# Patient Record
Sex: FEMALE | Race: NATIVE HAWAIIAN OR OTHER PACIFIC ISLANDER | URBAN - METROPOLITAN AREA
[De-identification: names, ages, dates, MRNs, and addresses within clinical notes are randomized per-mention and may not be internally consistent; named-entity substitution may affect disease eponyms.]

---

## 2017-05-08 ENCOUNTER — IMPORTED ENCOUNTER (OUTPATIENT)
Dept: URBAN - METROPOLITAN AREA CLINIC 31 | Facility: CLINIC | Age: 37
End: 2017-05-08

## 2017-05-08 PROCEDURE — 92015 DETERMINE REFRACTIVE STATE: CPT

## 2017-05-08 PROCEDURE — 92014 COMPRE OPH EXAM EST PT 1/>: CPT

## 2017-05-11 ENCOUNTER — IMPORTED ENCOUNTER (OUTPATIENT)
Dept: URBAN - METROPOLITAN AREA CLINIC 31 | Facility: CLINIC | Age: 37
End: 2017-05-11

## 2017-05-11 PROBLEM — H10.403: Noted: 2017-05-11

## 2017-05-11 PROCEDURE — 99213 OFFICE O/P EST LOW 20 MIN: CPT

## 2017-05-11 NOTE — PATIENT DISCUSSION
Allergic Conjunctivitis OU -- The condition was  discussed with the patient. Avoidance of allergens and cool compresses were recommended. Sample of Alrex given.

## 2020-06-11 ENCOUNTER — OFFICE VISIT (OUTPATIENT)
Dept: URBAN - METROPOLITAN AREA CLINIC 7 | Facility: CLINIC | Age: 40
End: 2020-06-11

## 2020-06-23 ENCOUNTER — TELEPHONE ENCOUNTER (OUTPATIENT)
Dept: URBAN - METROPOLITAN AREA CLINIC 9 | Facility: CLINIC | Age: 40
End: 2020-06-23

## 2020-06-23 ENCOUNTER — OFFICE VISIT (OUTPATIENT)
Dept: URBAN - METROPOLITAN AREA CLINIC 7 | Facility: CLINIC | Age: 40
End: 2020-06-23

## 2020-06-25 ENCOUNTER — TELEPHONE ENCOUNTER (OUTPATIENT)
Dept: URBAN - METROPOLITAN AREA CLINIC 9 | Facility: CLINIC | Age: 40
End: 2020-06-25

## 2020-06-27 LAB
MITOGEN-NIL: (no result)
NIL: (no result)
QUANTIFERON(R)-TB GOLD PLUS, 1 TUBE: POSITIVE
TB1-NIL: (no result)
TB2-NIL: (no result)

## 2020-06-29 ENCOUNTER — TELEPHONE ENCOUNTER (OUTPATIENT)
Dept: URBAN - METROPOLITAN AREA CLINIC 9 | Facility: CLINIC | Age: 40
End: 2020-06-29

## 2020-06-29 ENCOUNTER — OFFICE VISIT (OUTPATIENT)
Dept: URBAN - METROPOLITAN AREA CLINIC 7 | Facility: CLINIC | Age: 40
End: 2020-06-29

## 2020-06-30 ENCOUNTER — TELEPHONE ENCOUNTER (OUTPATIENT)
Dept: URBAN - METROPOLITAN AREA CLINIC 9 | Facility: CLINIC | Age: 40
End: 2020-06-30

## 2020-07-01 ENCOUNTER — TELEPHONE ENCOUNTER (OUTPATIENT)
Dept: URBAN - METROPOLITAN AREA CLINIC 9 | Facility: CLINIC | Age: 40
End: 2020-07-01

## 2020-07-08 ENCOUNTER — TELEPHONE ENCOUNTER (OUTPATIENT)
Dept: URBAN - METROPOLITAN AREA CLINIC 9 | Facility: CLINIC | Age: 40
End: 2020-07-08

## 2020-07-15 ENCOUNTER — TELEPHONE ENCOUNTER (OUTPATIENT)
Dept: URBAN - METROPOLITAN AREA CLINIC 9 | Facility: CLINIC | Age: 40
End: 2020-07-15

## 2020-07-16 ENCOUNTER — OFFICE VISIT (OUTPATIENT)
Dept: URBAN - METROPOLITAN AREA CLINIC 7 | Facility: CLINIC | Age: 40
End: 2020-07-16

## 2020-07-20 ENCOUNTER — TELEPHONE ENCOUNTER (OUTPATIENT)
Dept: URBAN - METROPOLITAN AREA CLINIC 9 | Facility: CLINIC | Age: 40
End: 2020-07-20

## 2020-07-23 ENCOUNTER — TELEPHONE ENCOUNTER (OUTPATIENT)
Dept: URBAN - METROPOLITAN AREA CLINIC 9 | Facility: CLINIC | Age: 40
End: 2020-07-23

## 2020-07-23 ENCOUNTER — OFFICE VISIT (OUTPATIENT)
Dept: URBAN - METROPOLITAN AREA CLINIC 7 | Facility: CLINIC | Age: 40
End: 2020-07-23

## 2020-07-24 ENCOUNTER — TELEPHONE ENCOUNTER (OUTPATIENT)
Dept: URBAN - METROPOLITAN AREA CLINIC 9 | Facility: CLINIC | Age: 40
End: 2020-07-24

## 2020-07-27 ENCOUNTER — OFFICE VISIT (OUTPATIENT)
Dept: URBAN - METROPOLITAN AREA CLINIC 7 | Facility: CLINIC | Age: 40
End: 2020-07-27

## 2020-07-28 ENCOUNTER — TELEPHONE ENCOUNTER (OUTPATIENT)
Dept: URBAN - METROPOLITAN AREA CLINIC 9 | Facility: CLINIC | Age: 40
End: 2020-07-28

## 2020-07-30 ENCOUNTER — OFFICE VISIT (OUTPATIENT)
Dept: URBAN - METROPOLITAN AREA CLINIC 7 | Facility: CLINIC | Age: 40
End: 2020-07-30

## 2020-08-06 ENCOUNTER — OFFICE VISIT (OUTPATIENT)
Dept: URBAN - METROPOLITAN AREA CLINIC 7 | Facility: CLINIC | Age: 40
End: 2020-08-06

## 2020-08-18 ENCOUNTER — OFFICE VISIT (OUTPATIENT)
Dept: URBAN - METROPOLITAN AREA SURGERY CENTER 5 | Facility: SURGERY CENTER | Age: 40
End: 2020-08-18

## 2020-08-25 ENCOUNTER — LAB OUTSIDE AN ENCOUNTER (OUTPATIENT)
Age: 40
End: 2020-08-25

## 2020-08-28 LAB
BUN/CREATININE RATIO: (no result)
CALCIUM: (no result)
CARBON DIOXIDE: (no result)
CHLORIDE: (no result)
CREATININE: (no result)
EGFR AFRICAN AMERICAN: 128
EGFR NON-AFR. AMERICAN: 110
GLUCOSE: (no result)
POTASSIUM: (no result)
SODIUM: (no result)
UREA NITROGEN (BUN): (no result)

## 2020-08-31 ENCOUNTER — TELEPHONE ENCOUNTER (OUTPATIENT)
Dept: URBAN - METROPOLITAN AREA CLINIC 9 | Facility: CLINIC | Age: 40
End: 2020-08-31

## 2020-09-22 ENCOUNTER — TELEPHONE ENCOUNTER (OUTPATIENT)
Dept: URBAN - METROPOLITAN AREA CLINIC 9 | Facility: CLINIC | Age: 40
End: 2020-09-22

## 2020-09-23 ENCOUNTER — TELEPHONE ENCOUNTER (OUTPATIENT)
Dept: URBAN - METROPOLITAN AREA CLINIC 9 | Facility: CLINIC | Age: 40
End: 2020-09-23

## 2020-10-01 ENCOUNTER — OFFICE VISIT (OUTPATIENT)
Age: 40
End: 2020-10-01

## 2020-10-02 ENCOUNTER — TELEPHONE ENCOUNTER (OUTPATIENT)
Dept: URBAN - METROPOLITAN AREA CLINIC 9 | Facility: CLINIC | Age: 40
End: 2020-10-02

## 2020-10-07 ENCOUNTER — OFFICE VISIT (OUTPATIENT)
Dept: URBAN - METROPOLITAN AREA CLINIC 7 | Facility: CLINIC | Age: 40
End: 2020-10-07

## 2020-11-02 ENCOUNTER — TELEPHONE ENCOUNTER (OUTPATIENT)
Dept: URBAN - METROPOLITAN AREA CLINIC 9 | Facility: CLINIC | Age: 40
End: 2020-11-02

## 2020-12-30 LAB — HELICOBACTER PYLORI AG, EIA, STOOL: (no result)

## 2021-01-06 ENCOUNTER — LAB OUTSIDE AN ENCOUNTER (OUTPATIENT)
Age: 41
End: 2021-01-06

## 2021-01-06 ENCOUNTER — TELEPHONE ENCOUNTER (OUTPATIENT)
Dept: URBAN - METROPOLITAN AREA CLINIC 9 | Facility: CLINIC | Age: 41
End: 2021-01-06

## 2021-01-07 ENCOUNTER — TELEPHONE ENCOUNTER (OUTPATIENT)
Dept: URBAN - METROPOLITAN AREA CLINIC 9 | Facility: CLINIC | Age: 41
End: 2021-01-07

## 2021-01-08 ENCOUNTER — TELEPHONE ENCOUNTER (OUTPATIENT)
Dept: URBAN - METROPOLITAN AREA CLINIC 9 | Facility: CLINIC | Age: 41
End: 2021-01-08

## 2021-05-21 ENCOUNTER — OFFICE VISIT (OUTPATIENT)
Dept: URBAN - METROPOLITAN AREA CLINIC 7 | Facility: CLINIC | Age: 41
End: 2021-05-21

## 2021-06-18 ENCOUNTER — OFFICE VISIT (OUTPATIENT)
Dept: URBAN - METROPOLITAN AREA SURGERY CENTER 5 | Facility: SURGERY CENTER | Age: 41
End: 2021-06-18

## 2021-06-30 ENCOUNTER — TELEPHONE ENCOUNTER (OUTPATIENT)
Dept: URBAN - METROPOLITAN AREA CLINIC 9 | Facility: CLINIC | Age: 41
End: 2021-06-30

## 2021-07-01 ENCOUNTER — OFFICE VISIT (OUTPATIENT)
Age: 41
End: 2021-07-01

## 2021-07-16 ENCOUNTER — TELEPHONE ENCOUNTER (OUTPATIENT)
Dept: URBAN - METROPOLITAN AREA CLINIC 9 | Facility: CLINIC | Age: 41
End: 2021-07-16

## 2021-07-16 ENCOUNTER — LAB OUTSIDE AN ENCOUNTER (OUTPATIENT)
Age: 41
End: 2021-07-16

## 2021-07-21 ENCOUNTER — TELEPHONE ENCOUNTER (OUTPATIENT)
Dept: URBAN - METROPOLITAN AREA CLINIC 9 | Facility: CLINIC | Age: 41
End: 2021-07-21

## 2021-07-26 ENCOUNTER — TELEPHONE ENCOUNTER (OUTPATIENT)
Dept: URBAN - METROPOLITAN AREA CLINIC 9 | Facility: CLINIC | Age: 41
End: 2021-07-26

## 2021-07-26 ENCOUNTER — OFFICE VISIT (OUTPATIENT)
Dept: URBAN - METROPOLITAN AREA CLINIC 7 | Facility: CLINIC | Age: 41
End: 2021-07-26

## 2021-08-06 ENCOUNTER — TELEPHONE ENCOUNTER (OUTPATIENT)
Dept: URBAN - METROPOLITAN AREA CLINIC 9 | Facility: CLINIC | Age: 41
End: 2021-08-06

## 2021-08-06 LAB — HELICOBACTER PYLORI AG, EIA, STOOL: (no result)

## 2021-08-09 ENCOUNTER — OFFICE VISIT (OUTPATIENT)
Dept: URBAN - METROPOLITAN AREA CLINIC 7 | Facility: CLINIC | Age: 41
End: 2021-08-09

## 2021-08-10 ENCOUNTER — TELEPHONE ENCOUNTER (OUTPATIENT)
Dept: URBAN - METROPOLITAN AREA CLINIC 9 | Facility: CLINIC | Age: 41
End: 2021-08-10

## 2021-09-03 ENCOUNTER — OFFICE VISIT (OUTPATIENT)
Dept: URBAN - METROPOLITAN AREA CLINIC 7 | Facility: CLINIC | Age: 41
End: 2021-09-03

## 2021-09-03 ENCOUNTER — TELEPHONE ENCOUNTER (OUTPATIENT)
Dept: URBAN - METROPOLITAN AREA CLINIC 9 | Facility: CLINIC | Age: 41
End: 2021-09-03

## 2021-09-08 ENCOUNTER — TELEPHONE ENCOUNTER (OUTPATIENT)
Dept: URBAN - METROPOLITAN AREA CLINIC 9 | Facility: CLINIC | Age: 41
End: 2021-09-08

## 2021-09-13 ENCOUNTER — TELEPHONE ENCOUNTER (OUTPATIENT)
Dept: URBAN - METROPOLITAN AREA CLINIC 9 | Facility: CLINIC | Age: 41
End: 2021-09-13

## 2021-09-13 LAB
ALBUMIN/GLOBULIN RATIO: (no result)
ALBUMIN: (no result)
ALKALINE PHOSPHATASE: (no result)
ALT: (no result)
AST: (no result)
BILIRUBIN, TOTAL: (no result)
BUN/CREATININE RATIO: (no result)
C-REACTIVE PROTEIN: (no result)
CALCIUM: (no result)
CARBON DIOXIDE: (no result)
CHLORIDE: (no result)
CREATININE: (no result)
EGFR AFRICAN AMERICAN: 128
EGFR NON-AFR. AMERICAN: 111
GLOBULIN: 3.2
GLUCOSE: (no result)
POTASSIUM: (no result)
PROTEIN, TOTAL: (no result)
SODIUM: (no result)
UREA NITROGEN (BUN): (no result)

## 2021-09-14 ENCOUNTER — OFFICE VISIT (OUTPATIENT)
Dept: URBAN - METROPOLITAN AREA SURGERY CENTER 5 | Facility: SURGERY CENTER | Age: 41
End: 2021-09-14

## 2021-09-14 ENCOUNTER — TELEPHONE ENCOUNTER (OUTPATIENT)
Dept: URBAN - METROPOLITAN AREA CLINIC 9 | Facility: CLINIC | Age: 41
End: 2021-09-14

## 2021-09-20 ENCOUNTER — TELEPHONE ENCOUNTER (OUTPATIENT)
Dept: URBAN - METROPOLITAN AREA CLINIC 9 | Facility: CLINIC | Age: 41
End: 2021-09-20

## 2021-09-22 ENCOUNTER — OFFICE VISIT (OUTPATIENT)
Dept: URBAN - METROPOLITAN AREA SURGERY CENTER 5 | Facility: SURGERY CENTER | Age: 41
End: 2021-09-22

## 2021-09-24 ENCOUNTER — TELEPHONE ENCOUNTER (OUTPATIENT)
Dept: URBAN - METROPOLITAN AREA CLINIC 9 | Facility: CLINIC | Age: 41
End: 2021-09-24

## 2021-09-27 ENCOUNTER — LAB OUTSIDE AN ENCOUNTER (OUTPATIENT)
Age: 41
End: 2021-09-27

## 2021-09-29 ENCOUNTER — TELEPHONE ENCOUNTER (OUTPATIENT)
Dept: URBAN - METROPOLITAN AREA CLINIC 9 | Facility: CLINIC | Age: 41
End: 2021-09-29

## 2021-10-01 ENCOUNTER — TELEPHONE ENCOUNTER (OUTPATIENT)
Dept: URBAN - METROPOLITAN AREA CLINIC 9 | Facility: CLINIC | Age: 41
End: 2021-10-01

## 2021-10-02 LAB
CALPROTECTIN, STOOL: (no result)
CLOSTRIDIUM DIFFICILE TOXIN/GDH W/REFL TO PCR: (no result)
HELICOBACTER PYLORI AG, EIA, STOOL: (no result)

## 2021-10-04 ENCOUNTER — OFFICE VISIT (OUTPATIENT)
Dept: URBAN - METROPOLITAN AREA CLINIC 7 | Facility: CLINIC | Age: 41
End: 2021-10-04

## 2021-10-04 ENCOUNTER — TELEPHONE ENCOUNTER (OUTPATIENT)
Dept: URBAN - METROPOLITAN AREA CLINIC 9 | Facility: CLINIC | Age: 41
End: 2021-10-04

## 2021-10-08 ENCOUNTER — TELEPHONE ENCOUNTER (OUTPATIENT)
Dept: URBAN - METROPOLITAN AREA CLINIC 9 | Facility: CLINIC | Age: 41
End: 2021-10-08

## 2021-11-03 LAB — Lab: (no result)

## 2021-11-04 LAB
C-REACTIVE PROTEIN: (no result)
HEPATITIS B SURFACE ANTIBODY QL: (no result)
HEPATITIS B SURFACE ANTIGEN: (no result)
HEPATITIS C ANTIBODY: (no result)
INDEX: 0.01
MITOGEN-NIL: (no result)
NIL: (no result)
QUANTIFERON(R)-TB GOLD PLUS, 1 TUBE: POSITIVE
TB1-NIL: (no result)
TB2-NIL: (no result)

## 2021-11-12 ENCOUNTER — TELEPHONE ENCOUNTER (OUTPATIENT)
Dept: URBAN - METROPOLITAN AREA CLINIC 9 | Facility: CLINIC | Age: 41
End: 2021-11-12

## 2022-01-18 ENCOUNTER — TELEPHONE ENCOUNTER (OUTPATIENT)
Dept: URBAN - METROPOLITAN AREA CLINIC 9 | Facility: CLINIC | Age: 42
End: 2022-01-18

## 2022-01-21 ENCOUNTER — TELEPHONE ENCOUNTER (OUTPATIENT)
Dept: URBAN - METROPOLITAN AREA CLINIC 9 | Facility: CLINIC | Age: 42
End: 2022-01-21

## 2022-01-27 ENCOUNTER — OFFICE VISIT (OUTPATIENT)
Age: 42
End: 2022-01-27

## 2022-03-08 ENCOUNTER — OFFICE VISIT (OUTPATIENT)
Age: 42
End: 2022-03-08

## 2022-03-09 ENCOUNTER — TELEPHONE ENCOUNTER (OUTPATIENT)
Dept: URBAN - METROPOLITAN AREA CLINIC 9 | Facility: CLINIC | Age: 42
End: 2022-03-09

## 2022-03-22 ENCOUNTER — TELEPHONE ENCOUNTER (OUTPATIENT)
Dept: URBAN - METROPOLITAN AREA CLINIC 9 | Facility: CLINIC | Age: 42
End: 2022-03-22

## 2022-03-28 ENCOUNTER — TELEPHONE ENCOUNTER (OUTPATIENT)
Dept: URBAN - METROPOLITAN AREA CLINIC 9 | Facility: CLINIC | Age: 42
End: 2022-03-28

## 2022-04-02 ASSESSMENT — VISUAL ACUITY
OD_CC: 20/40
OS_CC: 20/20
OS_CC: 20/25
OD_CC: 20/25
OD_PH: SC 20/25

## 2022-04-02 ASSESSMENT — TONOMETRY
OD_IOP_MMHG: 14
OS_IOP_MMHG: 14

## 2022-05-19 ENCOUNTER — TELEPHONE ENCOUNTER (OUTPATIENT)
Dept: URBAN - METROPOLITAN AREA CLINIC 9 | Facility: CLINIC | Age: 42
End: 2022-05-19

## 2022-05-20 ENCOUNTER — TELEPHONE ENCOUNTER (OUTPATIENT)
Dept: URBAN - METROPOLITAN AREA CLINIC 9 | Facility: CLINIC | Age: 42
End: 2022-05-20

## 2022-06-07 ENCOUNTER — TELEPHONE ENCOUNTER (OUTPATIENT)
Dept: URBAN - METROPOLITAN AREA CLINIC 9 | Facility: CLINIC | Age: 42
End: 2022-06-07

## 2022-06-13 ENCOUNTER — OFFICE VISIT (OUTPATIENT)
Dept: URBAN - METROPOLITAN AREA SURGERY CENTER 5 | Facility: SURGERY CENTER | Age: 42
End: 2022-06-13

## 2022-07-09 ENCOUNTER — TELEPHONE ENCOUNTER (OUTPATIENT)
Dept: URBAN - METROPOLITAN AREA CLINIC 121 | Facility: CLINIC | Age: 42
End: 2022-07-09

## 2022-07-09 RX ORDER — PREDNISONE 1 MG/1
TABLET ORAL
Refills: 0 | OUTPATIENT
Start: 2011-10-24 | End: 2013-02-21

## 2022-07-09 RX ORDER — MESALAMINE 4 G/60ML
ENEMA RECTAL
Refills: 0 | OUTPATIENT
Start: 2012-12-31 | End: 2013-01-15

## 2022-07-09 RX ORDER — MESALAMINE 4 G/60ML
ENEMA RECTAL ONCE A DAY
Refills: 0 | OUTPATIENT
Start: 2013-02-21 | End: 2013-02-21

## 2022-07-09 RX ORDER — MESALAMINE 1.2 G/1
TABLET, DELAYED RELEASE ORAL
Refills: 0 | OUTPATIENT
Start: 2011-10-24 | End: 2013-02-21

## 2022-07-10 ENCOUNTER — TELEPHONE ENCOUNTER (OUTPATIENT)
Dept: URBAN - METROPOLITAN AREA CLINIC 121 | Facility: CLINIC | Age: 42
End: 2022-07-10

## 2022-07-10 RX ORDER — MESALAMINE 800 MG/1
TABLET, DELAYED RELEASE ORAL
Refills: 1 | Status: ACTIVE | COMMUNITY
Start: 2013-05-14

## 2022-07-10 RX ORDER — SODIUM SULFATE, POTASSIUM SULFATE, MAGNESIUM SULFATE 17.5; 3.13; 1.6 G/ML; G/ML; G/ML
SOLUTION, CONCENTRATE ORAL TAKE AS DIRECTED
Refills: 0 | Status: ACTIVE | COMMUNITY
Start: 2011-10-24

## 2022-07-10 RX ORDER — MESALAMINE 4 G/60ML
1MO SUPPLY ENEMA RECTAL ONCE A DAY
Refills: 0 | Status: ACTIVE | COMMUNITY
Start: 2011-11-01

## 2022-07-10 RX ORDER — MESALAMINE 4 G/60ML
INSERT ENEMA RECTALLY QHS PRN ENEMA RECTAL
Refills: 0 | Status: ACTIVE | COMMUNITY
Start: 2013-02-22

## 2022-07-10 RX ORDER — MESALAMINE 800 MG/1
TABLET, DELAYED RELEASE ORAL
Refills: 1 | Status: ACTIVE | COMMUNITY
Start: 2011-10-24

## 2022-07-10 RX ORDER — MESALAMINE 800 MG/1
TABLET, DELAYED RELEASE ORAL
Refills: 0 | Status: ACTIVE | COMMUNITY
Start: 2013-02-21

## 2022-07-10 RX ORDER — PREDNISONE 10 MG/1
TAPERING DOSE STEROIDS40MG FRO 7 DAYS30MG 7 DAYS20MG 7DAYS10MG 5DAYS TABLET ORAL TAKE AS DIRECTED
Refills: 0 | Status: ACTIVE | COMMUNITY
Start: 2013-02-21

## 2022-07-10 RX ORDER — MESALAMINE 4 G/60ML
ENEMA RECTAL
Refills: 0 | Status: ACTIVE | COMMUNITY
Start: 2013-01-15

## 2022-07-10 RX ORDER — MESALAMINE 4 G/60ML
ENEMA RECTAL
Refills: 0 | Status: ACTIVE | COMMUNITY
Start: 2013-02-21

## 2022-07-30 ENCOUNTER — TELEPHONE ENCOUNTER (OUTPATIENT)
Age: 42
End: 2022-07-30

## 2022-07-30 RX ORDER — CLARITHROMYCIN 500 MG/1
1 (ONE) TABLET ORAL
Qty: 0 | Refills: 2 | OUTPATIENT
Start: 2018-06-07 | End: 2018-06-21

## 2022-07-30 RX ORDER — AMOXICILLIN 500 MG/1
2 (TWO) CAPSULE ORAL
Qty: 0 | Refills: 2 | OUTPATIENT
Start: 2020-08-31 | End: 2020-09-14

## 2022-07-30 RX ORDER — AMOXICILLIN 500 MG/1
2 (TWO) CAPSULE ORAL
Qty: 0 | Refills: 2 | OUTPATIENT
Start: 2017-10-10 | End: 2017-10-24

## 2022-07-30 RX ORDER — AMOXICILLIN 250 MG/1
3 (THREE) CAPSULE ORAL
Qty: 0 | Refills: 2 | OUTPATIENT
Start: 2021-08-09 | End: 2021-08-23

## 2022-07-30 RX ORDER — PREDNISONE 10 MG/1
1 (ONE) TABLET ORAL
Qty: 0 | Refills: 2 | OUTPATIENT
Start: 2016-06-29 | End: 2016-07-27

## 2022-07-30 RX ORDER — OMEPRAZOLE 20 MG/1
1 (ONE) CAPSULE, DELAYED RELEASE ORAL
Qty: 0 | Refills: 2 | OUTPATIENT
Start: 2018-06-07 | End: 2018-06-21

## 2022-07-30 RX ORDER — BISMUTH SUBSALICYLATE 262 MG/1
2 (TWO) TABLET, CHEWABLE ORAL
Qty: 0 | Refills: 2 | OUTPATIENT
Start: 2019-05-17 | End: 2019-05-31

## 2022-07-30 RX ORDER — AMOXICILLIN 500 MG/1
2 (TWO) CAPSULE ORAL
Qty: 0 | Refills: 2 | OUTPATIENT
Start: 2021-06-30 | End: 2021-07-14

## 2022-07-30 RX ORDER — CLARITHROMYCIN 500 MG/1
1 (ONE) TABLET ORAL
Qty: 0 | Refills: 2 | OUTPATIENT
Start: 2017-10-10 | End: 2017-10-24

## 2022-07-30 RX ORDER — MESALAMINE 1.2 G/1
3 (THREE) TABLET, DELAYED RELEASE ORAL DAILY
Qty: 0 | Refills: 2 | OUTPATIENT
Start: 2018-06-25 | End: 2018-09-23

## 2022-07-30 RX ORDER — OMEPRAZOLE 20 MG/1
1 (ONE) CAPSULE, DELAYED RELEASE ORAL
Qty: 0 | Refills: 2 | OUTPATIENT
Start: 2020-06-25 | End: 2020-07-09

## 2022-07-30 RX ORDER — AMOXICILLIN 500 MG/1
2 (TWO) CAPSULE ORAL
Qty: 0 | Refills: 2 | OUTPATIENT
Start: 2021-01-07 | End: 2021-01-21

## 2022-07-30 RX ORDER — MESALAMINE 4 G/60ML
1 (ONE) ENEMA RECTAL AT BEDTIME
Qty: 0 | Refills: 3 | OUTPATIENT
Start: 2020-09-22 | End: 2020-11-02

## 2022-07-30 RX ORDER — CLARITHROMYCIN 500 MG/1
1 (ONE) TABLET ORAL
Qty: 0 | Refills: 2 | OUTPATIENT
Start: 2021-06-30 | End: 2021-07-14

## 2022-07-30 RX ORDER — METRONIDAZOLE 250 MG/1
1 (ONE) TABLET ORAL
Qty: 0 | Refills: 2 | OUTPATIENT
Start: 2016-05-05 | End: 2016-05-12

## 2022-07-30 RX ORDER — CLARITHROMYCIN 500 MG/1
1 (ONE) TABLET ORAL
Qty: 0 | Refills: 2 | OUTPATIENT
Start: 2020-08-31 | End: 2020-09-14

## 2022-07-30 RX ORDER — OMEPRAZOLE 20 MG/1
1 (ONE) CAPSULE, DELAYED RELEASE ORAL
Qty: 0 | Refills: 2 | OUTPATIENT
Start: 2017-10-10 | End: 2017-10-24

## 2022-07-30 RX ORDER — LEVOFLOXACIN 500 MG/1
1 (ONE) TABLET, FILM COATED ORAL DAILY
Qty: 0 | Refills: 2 | OUTPATIENT
Start: 2021-08-09 | End: 2021-08-23

## 2022-07-30 RX ORDER — METRONIDAZOLE 250 MG/1
1 (ONE) TABLET ORAL
Qty: 0 | Refills: 2 | OUTPATIENT
Start: 2017-05-04 | End: 2017-05-11

## 2022-07-30 RX ORDER — DOXYCYCLINE HYCLATE 100 MG/1
1 (ONE) CAPSULE ORAL
Qty: 0 | Refills: 2 | OUTPATIENT
Start: 2019-05-17 | End: 2019-05-31

## 2022-07-30 RX ORDER — METRONIDAZOLE 500 MG/1
1 (ONE) TABLET ORAL
Qty: 0 | Refills: 2 | OUTPATIENT
Start: 2016-06-29 | End: 2016-07-13

## 2022-07-30 RX ORDER — PEPPERMINT OIL 90 MG
1-2 CAPSULE CAPSULE, DELAYED, AND EXTENDED RELEASE ORAL
Qty: 0 | Refills: 2 | OUTPATIENT
Start: 2016-05-02 | End: 2016-06-01

## 2022-07-30 RX ORDER — PANTOPRAZOLE SODIUM 40 MG/1
1 (ONE) TABLET, DELAYED RELEASE ORAL
Qty: 0 | Refills: 2 | OUTPATIENT
Start: 2019-05-17 | End: 2019-05-31

## 2022-07-30 RX ORDER — CLARITHROMYCIN 500 MG/1
1 (ONE) TABLET ORAL
Qty: 0 | Refills: 2 | OUTPATIENT
Start: 2021-01-07 | End: 2021-01-21

## 2022-07-30 RX ORDER — MESALAMINE 1.2 G/1
4 TABLET, DELAYED RELEASE ORAL DAILY
Qty: 0 | Refills: 5 | OUTPATIENT
Start: 2021-10-04 | End: 2022-02-07

## 2022-07-30 RX ORDER — METRONIDAZOLE 250 MG/1
1 (ONE) TABLET ORAL
Qty: 0 | Refills: 2 | OUTPATIENT
Start: 2019-05-17 | End: 2019-05-31

## 2022-07-30 RX ORDER — BUDESONIDE 3 MG/1
1 (ONE) CAPSULE, COATED PELLETS ORAL DAILY
Qty: 0 | Refills: 2 | OUTPATIENT
Start: 2020-06-11 | End: 2020-07-11

## 2022-07-30 RX ORDER — AMOXICILLIN 500 MG/1
2 (TWO) CAPSULE ORAL
Qty: 0 | Refills: 2 | OUTPATIENT
Start: 2018-06-07 | End: 2018-06-21

## 2022-07-30 RX ORDER — PANTOPRAZOLE SODIUM 40 MG/1
1 (ONE) TABLET, DELAYED RELEASE ORAL
Qty: 0 | Refills: 2 | OUTPATIENT
Start: 2020-08-31 | End: 2020-09-14

## 2022-07-30 RX ORDER — PANTOPRAZOLE SODIUM 40 MG/1
1 (ONE) TABLET, DELAYED RELEASE ORAL
Qty: 0 | Refills: 2 | OUTPATIENT
Start: 2021-01-07 | End: 2021-01-21

## 2022-07-30 RX ORDER — FAMOTIDINE 20 MG/1
1 TABLET ORAL
Qty: 0 | Refills: 16 | OUTPATIENT
Start: 2020-10-07 | End: 2022-03-08

## 2022-07-30 RX ORDER — PANTOPRAZOLE SODIUM 40 MG/1
1 (ONE) TABLET, DELAYED RELEASE ORAL
Qty: 0 | Refills: 2 | OUTPATIENT
Start: 2021-06-30 | End: 2021-07-14

## 2022-07-31 ENCOUNTER — TELEPHONE ENCOUNTER (OUTPATIENT)
Age: 42
End: 2022-07-31

## 2022-07-31 RX ORDER — PREDNISONE 10 MG/1
1 (ONE) TABLET ORAL
Qty: 0 | Refills: 2 | Status: ACTIVE | COMMUNITY
Start: 2016-06-29

## 2022-07-31 RX ORDER — BISMUTH SUBSALICYLATE 262 MG/1
2 (TWO) TABLET, CHEWABLE ORAL
Qty: 0 | Refills: 2 | Status: ACTIVE | COMMUNITY
Start: 2019-05-17

## 2022-07-31 RX ORDER — MESALAMINE 1.2 G/1
2 TABLET, DELAYED RELEASE ORAL DAILY
Qty: 0 | Refills: 5 | Status: ACTIVE | COMMUNITY
Start: 2021-08-09

## 2022-07-31 RX ORDER — PANTOPRAZOLE SODIUM 40 MG/1
1 (ONE) TABLET, DELAYED RELEASE ORAL
Qty: 0 | Refills: 2 | Status: ACTIVE | COMMUNITY
Start: 2021-06-29

## 2022-07-31 RX ORDER — MESALAMINE 4 G/60ML
1 (ONE) ENEMA RECTAL AT BEDTIME
Qty: 0 | Refills: 3 | Status: ACTIVE | COMMUNITY
Start: 2020-06-22

## 2022-07-31 RX ORDER — OMEPRAZOLE 20 MG/1
1 (ONE) CAPSULE, DELAYED RELEASE ORAL
Qty: 0 | Refills: 2 | Status: ACTIVE | COMMUNITY
Start: 2020-06-25

## 2022-07-31 RX ORDER — MESALAMINE 1.2 G/1
3 (THREE) TABLET, DELAYED RELEASE ORAL DAILY
Qty: 0 | Refills: 7 | Status: ACTIVE | COMMUNITY
Start: 2017-06-05

## 2022-07-31 RX ORDER — MESALAMINE 1.2 G/1
3 (THREE) TABLET, DELAYED RELEASE ORAL DAILY
Qty: 0 | Refills: 5 | Status: ACTIVE | COMMUNITY
Start: 2020-07-08

## 2022-07-31 RX ORDER — MESALAMINE 4 G/60ML
1 (ONE) ENEMA RECTAL AT BEDTIME
Qty: 0 | Refills: 3 | Status: ACTIVE | COMMUNITY
Start: 2020-09-21

## 2022-07-31 RX ORDER — PANTOPRAZOLE SODIUM 40 MG/1
1 (ONE) TABLET, DELAYED RELEASE ORAL
Qty: 0 | Refills: 2 | Status: ACTIVE | COMMUNITY
Start: 2019-05-17

## 2022-07-31 RX ORDER — MESALAMINE 4 G/60ML
1 (ONE) ENEMA RECTAL AT BEDTIME
Qty: 0 | Refills: 3 | Status: ACTIVE | COMMUNITY
Start: 2020-03-24

## 2022-07-31 RX ORDER — FAMOTIDINE 20 MG/1
1 TABLET ORAL
Qty: 0 | Refills: 16 | Status: ACTIVE | COMMUNITY
Start: 2020-10-07

## 2022-07-31 RX ORDER — METRONIDAZOLE 250 MG/1
1 (ONE) TABLET ORAL
Qty: 0 | Refills: 2 | Status: ACTIVE | COMMUNITY
Start: 2017-05-04

## 2022-07-31 RX ORDER — MESALAMINE 1.2 G/1
3 (THREE) TABLET, DELAYED RELEASE ORAL DAILY
Qty: 0 | Refills: 5 | Status: ACTIVE | COMMUNITY
Start: 2019-05-17

## 2022-07-31 RX ORDER — AMOXICILLIN 500 MG/1
2 (TWO) CAPSULE ORAL
Qty: 0 | Refills: 2 | Status: ACTIVE | COMMUNITY
Start: 2021-01-07

## 2022-07-31 RX ORDER — AMOXICILLIN 500 MG/1
2 (TWO) CAPSULE ORAL
Qty: 0 | Refills: 2 | Status: ACTIVE | COMMUNITY
Start: 2021-06-30

## 2022-07-31 RX ORDER — MESALAMINE 4 G/60ML
1 (ONE) ENEMA RECTAL AT BEDTIME
Qty: 0 | Refills: 3 | Status: ACTIVE | COMMUNITY
Start: 2022-03-28

## 2022-07-31 RX ORDER — DOXYCYCLINE HYCLATE 100 MG/1
1 (ONE) CAPSULE ORAL
Qty: 0 | Refills: 2 | Status: ACTIVE | COMMUNITY
Start: 2019-05-17

## 2022-07-31 RX ORDER — MESALAMINE 4 G/60ML
1 (ONE) ENEMA RECTAL AT BEDTIME
Qty: 0 | Refills: 3 | Status: ACTIVE | COMMUNITY
Start: 2021-10-04

## 2022-07-31 RX ORDER — MESALAMINE 1.2 G/1
2 TABLET, DELAYED RELEASE ORAL DAILY
Qty: 0 | Refills: 5 | Status: ACTIVE | COMMUNITY
Start: 2021-08-10

## 2022-07-31 RX ORDER — OMEPRAZOLE 20 MG/1
1 (ONE) CAPSULE, DELAYED RELEASE ORAL
Qty: 0 | Refills: 2 | Status: ACTIVE | COMMUNITY
Start: 2017-10-10

## 2022-07-31 RX ORDER — PANTOPRAZOLE SODIUM 40 MG/1
1 (ONE) TABLET, DELAYED RELEASE ORAL
Qty: 0 | Refills: 2 | Status: ACTIVE | COMMUNITY
Start: 2021-01-06

## 2022-07-31 RX ORDER — AMOXICILLIN 500 MG/1
2 (TWO) CAPSULE ORAL
Qty: 0 | Refills: 2 | Status: ACTIVE | COMMUNITY
Start: 2017-10-09

## 2022-07-31 RX ORDER — AMOXICILLIN 500 MG/1
2 (TWO) CAPSULE ORAL
Qty: 0 | Refills: 2 | Status: ACTIVE | COMMUNITY
Start: 2021-06-29

## 2022-07-31 RX ORDER — PANTOPRAZOLE SODIUM 40 MG/1
1 (ONE) TABLET, DELAYED RELEASE ORAL
Qty: 0 | Refills: 2 | Status: ACTIVE | COMMUNITY
Start: 2020-08-31

## 2022-07-31 RX ORDER — CLARITHROMYCIN 500 MG/1
1 (ONE) TABLET ORAL
Qty: 0 | Refills: 2 | Status: ACTIVE | COMMUNITY
Start: 2021-01-06

## 2022-07-31 RX ORDER — CLARITHROMYCIN 500 MG/1
1 (ONE) TABLET ORAL
Qty: 0 | Refills: 2 | Status: ACTIVE | COMMUNITY
Start: 2017-10-10

## 2022-07-31 RX ORDER — METRONIDAZOLE 250 MG/1
1 (ONE) TABLET ORAL
Qty: 0 | Refills: 2 | Status: ACTIVE | COMMUNITY
Start: 2016-05-05

## 2022-07-31 RX ORDER — MESALAMINE 4 G/60ML
1 (ONE) ENEMA RECTAL AT BEDTIME
Qty: 0 | Refills: 3 | Status: ACTIVE | COMMUNITY
Start: 2021-10-07

## 2022-07-31 RX ORDER — MESALAMINE 1.2 G/1
1 TABLET, DELAYED RELEASE ORAL
Qty: 0 | Refills: 7 | Status: ACTIVE | COMMUNITY

## 2022-07-31 RX ORDER — LEVOFLOXACIN 500 MG/1
1 (ONE) TABLET, FILM COATED ORAL DAILY
Qty: 0 | Refills: 2 | Status: ACTIVE | COMMUNITY
Start: 2021-08-09

## 2022-07-31 RX ORDER — MESALAMINE 1.2 G/1
4 TABLET, DELAYED RELEASE ORAL DAILY
Qty: 0 | Refills: 5 | Status: ACTIVE | COMMUNITY
Start: 2021-10-04

## 2022-07-31 RX ORDER — MESALAMINE 4 G/60ML
1 (ONE) ENEMA RECTAL AT BEDTIME
Qty: 0 | Refills: 3 | Status: ACTIVE | COMMUNITY
Start: 2020-06-23

## 2022-07-31 RX ORDER — MESALAMINE 4 G/60ML
1 (ONE) ENEMA RECTAL AT BEDTIME
Qty: 0 | Refills: 3 | Status: ACTIVE | COMMUNITY
Start: 2020-09-22

## 2022-07-31 RX ORDER — MESALAMINE 4 G/60ML
1 (ONE) ENEMA RECTAL AT BEDTIME
Qty: 0 | Refills: 3 | Status: ACTIVE | COMMUNITY
Start: 2021-10-08

## 2022-07-31 RX ORDER — OMEPRAZOLE 20 MG/1
1 (ONE) CAPSULE, DELAYED RELEASE ORAL
Qty: 0 | Refills: 2 | Status: ACTIVE | COMMUNITY
Start: 2018-06-07

## 2022-07-31 RX ORDER — CLARITHROMYCIN 500 MG/1
1 (ONE) TABLET ORAL
Qty: 0 | Refills: 2 | Status: ACTIVE | COMMUNITY
Start: 2021-06-30

## 2022-07-31 RX ORDER — CLARITHROMYCIN 500 MG/1
1 (ONE) TABLET ORAL
Qty: 0 | Refills: 2 | Status: ACTIVE | COMMUNITY
Start: 2018-06-07

## 2022-07-31 RX ORDER — MESALAMINE 4 G/60ML
1 (ONE) ENEMA RECTAL AT BEDTIME
Qty: 0 | Refills: 3 | Status: ACTIVE | COMMUNITY
Start: 2019-10-23

## 2022-07-31 RX ORDER — MESALAMINE 1.2 G/1
3 (THREE) TABLET, DELAYED RELEASE ORAL DAILY
Qty: 0 | Refills: 2 | Status: ACTIVE | COMMUNITY
Start: 2018-06-25

## 2022-07-31 RX ORDER — CLARITHROMYCIN 500 MG/1
1 (ONE) TABLET ORAL
Qty: 0 | Refills: 2 | Status: ACTIVE | COMMUNITY
Start: 2017-10-09

## 2022-07-31 RX ORDER — PEPPERMINT OIL 90 MG
1-2 CAPSULE, DELAYED, AND EXTENDED RELEASE ORAL
Qty: 0 | Refills: 2 | Status: ACTIVE | COMMUNITY
Start: 2016-05-02

## 2022-07-31 RX ORDER — PANTOPRAZOLE SODIUM 40 MG/1
1 (ONE) TABLET, DELAYED RELEASE ORAL
Qty: 0 | Refills: 2 | Status: ACTIVE | COMMUNITY
Start: 2021-01-07

## 2022-07-31 RX ORDER — MESALAMINE 1.2 G/1
2 TABLET, DELAYED RELEASE ORAL DAILY
Qty: 0 | Refills: 5 | Status: ACTIVE | COMMUNITY
Start: 2021-09-03

## 2022-07-31 RX ORDER — METRONIDAZOLE 250 MG/1
1 (ONE) TABLET ORAL
Qty: 0 | Refills: 2 | Status: ACTIVE | COMMUNITY
Start: 2019-05-17

## 2022-07-31 RX ORDER — MESALAMINE 4 G/60ML
1 (ONE) ENEMA RECTAL AT BEDTIME
Qty: 0 | Refills: 4 | Status: ACTIVE | COMMUNITY
Start: 2018-04-30

## 2022-07-31 RX ORDER — BUDESONIDE 3 MG/1
1 (ONE) CAPSULE, COATED PELLETS ORAL DAILY
Qty: 0 | Refills: 2 | Status: ACTIVE | COMMUNITY
Start: 2020-06-11

## 2022-07-31 RX ORDER — PANTOPRAZOLE SODIUM 40 MG/1
1 (ONE) TABLET, DELAYED RELEASE ORAL
Qty: 0 | Refills: 2 | Status: ACTIVE | COMMUNITY
Start: 2021-06-30

## 2022-07-31 RX ORDER — MESALAMINE 4 G/60ML
1 (ONE) ENEMA RECTAL AT BEDTIME
Qty: 0 | Refills: 3 | Status: ACTIVE | COMMUNITY
Start: 2019-10-22

## 2022-07-31 RX ORDER — AMOXICILLIN 250 MG/1
3 (THREE) CAPSULE ORAL
Qty: 0 | Refills: 2 | Status: ACTIVE | COMMUNITY
Start: 2021-08-09

## 2022-07-31 RX ORDER — OMEPRAZOLE 20 MG/1
1 (ONE) CAPSULE, DELAYED RELEASE ORAL
Qty: 0 | Refills: 2 | Status: ACTIVE | COMMUNITY
Start: 2020-06-24

## 2022-07-31 RX ORDER — CLARITHROMYCIN 500 MG/1
1 (ONE) TABLET ORAL
Qty: 0 | Refills: 2 | Status: ACTIVE | COMMUNITY
Start: 2020-08-31

## 2022-07-31 RX ORDER — OMEPRAZOLE 20 MG/1
1 (ONE) CAPSULE, DELAYED RELEASE ORAL
Qty: 0 | Refills: 2 | Status: ACTIVE | COMMUNITY
Start: 2017-10-09

## 2022-07-31 RX ORDER — CLARITHROMYCIN 500 MG/1
1 (ONE) TABLET ORAL
Qty: 0 | Refills: 2 | Status: ACTIVE | COMMUNITY
Start: 2021-01-07

## 2022-07-31 RX ORDER — MESALAMINE 1.2 G/1
3 (THREE) TABLET, DELAYED RELEASE ORAL DAILY
Qty: 0 | Refills: 7 | Status: ACTIVE | COMMUNITY
Start: 2016-03-21

## 2022-07-31 RX ORDER — MESALAMINE 4 G/60ML
1 (ONE) ENEMA RECTAL AT BEDTIME
Qty: 0 | Refills: 3 | Status: ACTIVE | COMMUNITY
Start: 2020-07-01

## 2022-07-31 RX ORDER — MESALAMINE 4 G/60ML
1 (ONE) ENEMA RECTAL AT BEDTIME
Qty: 0 | Refills: 3 | Status: ACTIVE | COMMUNITY
Start: 2022-01-18

## 2022-07-31 RX ORDER — MESALAMINE 1.2 G/1
3 (THREE) TABLET, DELAYED RELEASE ORAL DAILY
Qty: 0 | Refills: 5 | Status: ACTIVE | COMMUNITY
Start: 2020-06-24

## 2022-07-31 RX ORDER — CLARITHROMYCIN 500 MG/1
1 (ONE) TABLET ORAL
Qty: 0 | Refills: 2 | Status: ACTIVE | COMMUNITY
Start: 2021-06-29

## 2022-07-31 RX ORDER — MESALAMINE 4 G/60ML
1 (ONE) ENEMA RECTAL AT BEDTIME
Qty: 0 | Refills: 3 | Status: ACTIVE | COMMUNITY
Start: 2020-03-23

## 2022-07-31 RX ORDER — AMOXICILLIN 500 MG/1
2 (TWO) CAPSULE ORAL
Qty: 0 | Refills: 2 | Status: ACTIVE | COMMUNITY
Start: 2018-06-07

## 2022-07-31 RX ORDER — MESALAMINE 1.2 G/1
3 (THREE) TABLET, DELAYED RELEASE ORAL DAILY
Qty: 0 | Refills: 5 | Status: ACTIVE | COMMUNITY
Start: 2020-06-25

## 2022-07-31 RX ORDER — MESALAMINE 4 G/60ML
1 (ONE) ENEMA RECTAL AT BEDTIME
Qty: 0 | Refills: 4 | Status: ACTIVE | COMMUNITY
Start: 2018-06-20

## 2022-07-31 RX ORDER — MESALAMINE 4 G/60ML
1 (ONE) ENEMA RECTAL AT BEDTIME
Qty: 0 | Refills: 4 | Status: ACTIVE | COMMUNITY
Start: 2018-10-01

## 2022-07-31 RX ORDER — METRONIDAZOLE 500 MG/1
1 (ONE) TABLET ORAL
Qty: 0 | Refills: 2 | Status: ACTIVE | COMMUNITY
Start: 2016-06-29

## 2022-07-31 RX ORDER — MESALAMINE 4 G/60ML
1 (ONE) ENEMA RECTAL AT BEDTIME
Qty: 0 | Refills: 4 | Status: ACTIVE | COMMUNITY
Start: 2018-06-19

## 2022-07-31 RX ORDER — MESALAMINE 4 G/60ML
1 (ONE) ENEMA RECTAL AT BEDTIME
Qty: 0 | Refills: 4 | Status: ACTIVE | COMMUNITY
Start: 2017-10-25

## 2022-07-31 RX ORDER — AMOXICILLIN 500 MG/1
2 (TWO) CAPSULE ORAL
Qty: 0 | Refills: 2 | Status: ACTIVE | COMMUNITY
Start: 2021-01-06

## 2022-07-31 RX ORDER — AMOXICILLIN 500 MG/1
2 (TWO) CAPSULE ORAL
Qty: 0 | Refills: 2 | Status: ACTIVE | COMMUNITY
Start: 2017-10-10

## 2022-07-31 RX ORDER — MESALAMINE 1.2 G/1
3 (THREE) TABLET, DELAYED RELEASE ORAL DAILY
Qty: 0 | Refills: 4 | Status: ACTIVE | COMMUNITY
Start: 2017-06-26

## 2022-07-31 RX ORDER — MESALAMINE 4 G/60ML
1 (ONE) ENEMA RECTAL AT BEDTIME
Qty: 0 | Refills: 3 | Status: ACTIVE | COMMUNITY
Start: 2022-05-20

## 2022-07-31 RX ORDER — AMOXICILLIN 500 MG/1
2 (TWO) CAPSULE ORAL
Qty: 0 | Refills: 2 | Status: ACTIVE | COMMUNITY
Start: 2020-08-31

## 2022-08-18 ENCOUNTER — ERX REFILL RESPONSE (OUTPATIENT)
Dept: URBAN - METROPOLITAN AREA CLINIC 7 | Facility: CLINIC | Age: 42
End: 2022-08-18

## 2022-08-18 RX ORDER — MESALAMINE 1.2 G/1
1 TABLET, DELAYED RELEASE ORAL
Qty: 0 | Refills: 7 | OUTPATIENT

## 2022-08-18 RX ORDER — MESALAMINE 1.2 G/1
TAKE TWO TABLETS BY MOUTH ONE TIME DAILY TABLET, DELAYED RELEASE ORAL
Qty: 180 TABLET | Refills: 3 | OUTPATIENT

## 2022-10-04 ENCOUNTER — OFFICE VISIT (OUTPATIENT)
Dept: URBAN - METROPOLITAN AREA CLINIC 7 | Facility: CLINIC | Age: 42
End: 2022-10-04

## 2022-10-11 ENCOUNTER — OFFICE VISIT (OUTPATIENT)
Dept: URBAN - METROPOLITAN AREA CLINIC 7 | Facility: CLINIC | Age: 42
End: 2022-10-11

## 2022-10-11 RX ORDER — CLARITHROMYCIN 500 MG/1
1 (ONE) TABLET ORAL
Qty: 0 | Refills: 2 | Status: DISCONTINUED | COMMUNITY
Start: 2021-01-07

## 2022-10-11 RX ORDER — PREDNISONE 10 MG/1
1 (ONE) TABLET ORAL
Qty: 0 | Refills: 2 | Status: DISCONTINUED | COMMUNITY
Start: 2016-06-29

## 2022-10-11 RX ORDER — AMOXICILLIN 500 MG/1
2 (TWO) CAPSULE ORAL
Qty: 0 | Refills: 2 | Status: DISCONTINUED | COMMUNITY
Start: 2018-06-07

## 2022-10-11 RX ORDER — OMEPRAZOLE 20 MG/1
1 (ONE) CAPSULE, DELAYED RELEASE ORAL
Qty: 0 | Refills: 2 | Status: DISCONTINUED | COMMUNITY
Start: 2020-06-25

## 2022-10-11 RX ORDER — AMOXICILLIN 250 MG/1
3 (THREE) CAPSULE ORAL
Qty: 0 | Refills: 2 | Status: DISCONTINUED | COMMUNITY
Start: 2021-08-09

## 2022-10-11 RX ORDER — MESALAMINE 1.2 G/1
TAKE TWO TABLETS BY MOUTH ONE TIME DAILY TABLET, DELAYED RELEASE ORAL
Qty: 180 TABLET | Refills: 3 | COMMUNITY

## 2022-10-11 RX ORDER — PEPPERMINT OIL 90 MG
1-2 CAPSULE, DELAYED, AND EXTENDED RELEASE ORAL
Qty: 0 | Refills: 2 | Status: DISCONTINUED | COMMUNITY
Start: 2016-05-02

## 2022-10-11 RX ORDER — LEVOFLOXACIN 500 MG/1
1 (ONE) TABLET, FILM COATED ORAL DAILY
Qty: 0 | Refills: 2 | Status: DISCONTINUED | COMMUNITY
Start: 2021-08-09

## 2022-10-11 RX ORDER — BISMUTH SUBSALICYLATE 262 MG/1
2 (TWO) TABLET, CHEWABLE ORAL
Qty: 0 | Refills: 2 | Status: DISCONTINUED | COMMUNITY
Start: 2019-05-17

## 2022-10-11 RX ORDER — SODIUM SULFATE, POTASSIUM SULFATE, MAGNESIUM SULFATE 17.5; 3.13; 1.6 G/ML; G/ML; G/ML
SOLUTION, CONCENTRATE ORAL TAKE AS DIRECTED
Refills: 0 | Status: DISCONTINUED | COMMUNITY
Start: 2011-10-24

## 2022-10-11 RX ORDER — MESALAMINE 800 MG/1
TABLET, DELAYED RELEASE ORAL
Refills: 0 | Status: DISCONTINUED | COMMUNITY
Start: 2013-02-21

## 2022-10-11 RX ORDER — BUDESONIDE 3 MG/1
1 (ONE) CAPSULE, COATED PELLETS ORAL DAILY
Qty: 0 | Refills: 2 | Status: DISCONTINUED | COMMUNITY
Start: 2020-06-11

## 2022-10-11 RX ORDER — METRONIDAZOLE 250 MG/1
1 (ONE) TABLET ORAL
Qty: 0 | Refills: 2 | Status: DISCONTINUED | COMMUNITY
Start: 2019-05-17

## 2022-10-11 RX ORDER — MESALAMINE 4 G/60ML
1 (ONE) ENEMA RECTAL AT BEDTIME
Qty: 0 | Refills: 3 | Status: DISCONTINUED | COMMUNITY
Start: 2020-03-24

## 2022-10-11 RX ORDER — METRONIDAZOLE 500 MG/1
1 (ONE) TABLET ORAL
Qty: 0 | Refills: 2 | Status: DISCONTINUED | COMMUNITY
Start: 2016-06-29

## 2022-10-11 RX ORDER — DOXYCYCLINE HYCLATE 100 MG/1
1 (ONE) CAPSULE ORAL
Qty: 0 | Refills: 2 | Status: DISCONTINUED | COMMUNITY
Start: 2019-05-17

## 2022-10-11 RX ORDER — FAMOTIDINE 20 MG/1
1 TABLET ORAL
Qty: 0 | Refills: 16 | Status: DISCONTINUED | COMMUNITY
Start: 2020-10-07

## 2022-10-11 RX ORDER — PANTOPRAZOLE SODIUM 40 MG/1
1 (ONE) TABLET, DELAYED RELEASE ORAL
Qty: 0 | Refills: 2 | COMMUNITY
Start: 2021-06-29

## 2023-03-29 ENCOUNTER — TELEPHONE ENCOUNTER (OUTPATIENT)
Dept: URBAN - METROPOLITAN AREA CLINIC 7 | Facility: CLINIC | Age: 43
End: 2023-03-29

## 2023-03-29 ENCOUNTER — TELEPHONE ENCOUNTER (OUTPATIENT)
Dept: URBAN - METROPOLITAN AREA SURGERY CENTER 5 | Facility: SURGERY CENTER | Age: 43
End: 2023-03-29

## 2023-03-29 VITALS — BODY MASS INDEX: 34.15 KG/M2 | WEIGHT: 200 LBS | HEIGHT: 64 IN

## 2023-03-29 RX ORDER — PANTOPRAZOLE SODIUM 40 MG/1
1 (ONE) TABLET, DELAYED RELEASE ORAL
Qty: 0 | Refills: 2 | Status: DISCONTINUED | COMMUNITY
Start: 2021-06-29

## 2023-03-29 RX ORDER — FERROUS SULFATE 325(65) MG
1 TABLET TABLET ORAL ONCE A DAY
Status: ACTIVE | COMMUNITY

## 2023-03-29 RX ORDER — MESALAMINE 1.2 G/1
TAKE TWO TABLETS BY MOUTH ONE TIME DAILY TABLET, DELAYED RELEASE ORAL
Qty: 180 TABLET | Refills: 3 | Status: ACTIVE | COMMUNITY

## 2023-04-03 ENCOUNTER — LAB OUTSIDE AN ENCOUNTER (OUTPATIENT)
Dept: URBAN - METROPOLITAN AREA SURGERY CENTER 5 | Facility: SURGERY CENTER | Age: 43
End: 2023-04-03

## 2023-05-08 ENCOUNTER — NEW PATIENT (OUTPATIENT)
Dept: URBAN - METROPOLITAN AREA CLINIC 31 | Facility: CLINIC | Age: 43
End: 2023-05-08

## 2023-05-08 DIAGNOSIS — H52.13: ICD-10-CM

## 2023-05-08 PROCEDURE — 92015 DETERMINE REFRACTIVE STATE: CPT

## 2023-05-08 PROCEDURE — 92004 COMPRE OPH EXAM NEW PT 1/>: CPT

## 2023-05-08 ASSESSMENT — TONOMETRY
OS_IOP_MMHG: 15
OD_IOP_MMHG: 15

## 2023-05-08 ASSESSMENT — VISUAL ACUITY
OD_SC: 20/20-3
OU_SC: J3 @ 18"
OD_SC: J3
OS_SC: 20/20-2
OS_SC: J3
OS_CC: 20/25+2
OS_CC: J1+
OD_CC: J1+
OD_CC: 20/25+2

## 2023-05-09 ENCOUNTER — TELEPHONE ENCOUNTER (OUTPATIENT)
Dept: URBAN - METROPOLITAN AREA SURGERY CENTER 5 | Facility: SURGERY CENTER | Age: 43
End: 2023-05-09

## 2023-05-09 ENCOUNTER — TELEPHONE ENCOUNTER (OUTPATIENT)
Dept: URBAN - METROPOLITAN AREA CLINIC 7 | Facility: CLINIC | Age: 43
End: 2023-05-09

## 2023-05-09 RX ORDER — MESALAMINE 1.2 G/1
TAKE TWO TABLETS BY MOUTH ONE TIME DAILY TABLET, DELAYED RELEASE ORAL
Qty: 180 TABLET | Refills: 3 | Status: ACTIVE | COMMUNITY

## 2023-05-09 RX ORDER — FERROUS SULFATE 325(65) MG
1 TABLET TABLET ORAL ONCE A DAY
Status: ACTIVE | COMMUNITY

## 2023-06-30 ENCOUNTER — TELEPHONE ENCOUNTER (OUTPATIENT)
Dept: URBAN - METROPOLITAN AREA CLINIC 7 | Facility: CLINIC | Age: 43
End: 2023-06-30

## 2023-07-20 ENCOUNTER — OFFICE VISIT (OUTPATIENT)
Dept: URBAN - METROPOLITAN AREA CLINIC 7 | Facility: CLINIC | Age: 43
End: 2023-07-20
Payer: COMMERCIAL

## 2023-07-20 ENCOUNTER — WEB ENCOUNTER (OUTPATIENT)
Dept: URBAN - METROPOLITAN AREA CLINIC 7 | Facility: CLINIC | Age: 43
End: 2023-07-20

## 2023-07-20 ENCOUNTER — TELEPHONE ENCOUNTER (OUTPATIENT)
Dept: URBAN - METROPOLITAN AREA CLINIC 7 | Facility: CLINIC | Age: 43
End: 2023-07-20

## 2023-07-20 VITALS
SYSTOLIC BLOOD PRESSURE: 128 MMHG | DIASTOLIC BLOOD PRESSURE: 86 MMHG | RESPIRATION RATE: 16 BRPM | BODY MASS INDEX: 39.61 KG/M2 | HEIGHT: 64 IN | WEIGHT: 232 LBS | TEMPERATURE: 97.8 F

## 2023-07-20 DIAGNOSIS — K21.9 GASTROESOPHAGEAL REFLUX DISEASE, ESOPHAGITIS PRESENCE NOT SPECIFIED: ICD-10-CM

## 2023-07-20 DIAGNOSIS — K52.9 CHRONIC DIARRHEA: ICD-10-CM

## 2023-07-20 DIAGNOSIS — K62.5 RECTAL BLEEDING: ICD-10-CM

## 2023-07-20 DIAGNOSIS — K51.90 ULCERATIVE COLITIS: ICD-10-CM

## 2023-07-20 PROBLEM — 236071009: Status: ACTIVE | Noted: 2023-07-20

## 2023-07-20 PROBLEM — 12063002: Status: ACTIVE | Noted: 2023-07-20

## 2023-07-20 PROCEDURE — 99205 OFFICE O/P NEW HI 60 MIN: CPT | Performed by: INTERNAL MEDICINE

## 2023-07-20 RX ORDER — MESALAMINE 1.2 G/1
4 TABLETS WITH A MEAL TABLET, DELAYED RELEASE ORAL ONCE A DAY
Qty: 120 | Refills: 3 | OUTPATIENT
Start: 2023-07-20 | End: 2023-11-17

## 2023-07-20 RX ORDER — OMEPRAZOLE 20 MG/1
1 CAPSULE 30 MINUTES BEFORE MORNING MEAL CAPSULE, DELAYED RELEASE ORAL ONCE A DAY
Status: ACTIVE | COMMUNITY

## 2023-07-20 RX ORDER — FERROUS SULFATE 325(65) MG
1 TABLET TABLET ORAL ONCE A DAY
Status: DISCONTINUED | COMMUNITY

## 2023-07-20 RX ORDER — PREDNISONE 5 MG/1
8 TABS DAILY FOR 1 WEEK, THEN 7 TABS DAILY FOR 1 WEEK, THEN 6 TABS DAILY FOR 1 WEEK, THEN 5 TABS DAILY FOR 1 WEEK, THEN 4 TABS DAILY FOR 1 WEEK, THEN 3 TABS DAILY FOR 1 WEEKS, THEN 2 TABS DAILY FOR 1 WEEK, THEN 1 TAB DAILY FOR 1 WEEK TABLET ORAL AS DIRECTED
Qty: 252 | Refills: 0 | OUTPATIENT
Start: 2023-07-20 | End: 2023-09-14

## 2023-07-20 RX ORDER — MESALAMINE 1.2 G/1
TAKE TWO TABLETS BY MOUTH ONE TIME DAILY TABLET, DELAYED RELEASE ORAL
Qty: 180 TABLET | Refills: 3 | Status: ACTIVE | COMMUNITY

## 2023-07-20 RX ORDER — MESALAMINE 4 G/60 ML
AS DIRECTED KIT RECTAL
Status: DISCONTINUED | COMMUNITY

## 2023-07-20 RX ORDER — MESALAMINE 4 G/60ML
AS DIRECTED ENEMA RECTAL
Qty: 30 | Refills: 2 | OUTPATIENT
Start: 2023-07-20 | End: 2023-10-18

## 2023-07-20 NOTE — HPI-TODAY'S VISIT:
23-year-old female with history of ulcerative colitis had been on mesalamine for 8 years previously on Lialda and occasionally a rectal mesalamine for at least the past 10 years.  In September 2021 she had a colonoscopy that was poorly prepped showing Moseley 2 colitis in descending: Continuous through rectum.  At that time her fecal calprotectin was 1130.  C. difficile and H. pylori stool was negative.  Her initial UC diagnosis was in 2008.  She was previously status post quad therapy for H. pylori treated in 2021 by Dr. Ramesh.  She had had multiple salvage therapies for H. pylori and had been treated at least 5 attempts.  She is currently on mesalamine 2.4 g daily.  No recent fevers chills.  She has about a dozen watery bowel movements per day sometimes mixed with blood.  She had previously tested positive for TB and was initiated in an INH regimen for 12 weeks.

## 2023-08-24 ENCOUNTER — OFFICE VISIT (OUTPATIENT)
Dept: URBAN - METROPOLITAN AREA SURGERY CENTER 5 | Facility: SURGERY CENTER | Age: 43
End: 2023-08-24

## 2023-09-24 LAB
A/G RATIO: 1.5
ABSOLUTE BASOPHILS: 118
ABSOLUTE EOSINOPHILS: 291
ABSOLUTE LYMPHOCYTES: 3585
ABSOLUTE MONOCYTES: 555
ABSOLUTE NEUTROPHILS: 4550
ALBUMIN: 4.1
ALKALINE PHOSPHATASE: 49
ALT (SGPT): 14
AST (SGOT): 15
BASOPHILS: 1.3
BILIRUBIN, TOTAL: 0.3
BUN/CREATININE RATIO: (no result)
BUN: 9
CALCIUM: 9.3
CARBON DIOXIDE, TOTAL: 30
CHLORIDE: 100
CREATININE: 0.73
EGFR: 106
EOSINOPHILS: 3.2
GLOBULIN, TOTAL: 2.8
GLUCOSE: 89
HEMATOCRIT: 32.8
HEMOGLOBIN: 11.2
HEPATITIS B CORE AB TOTAL: (no result)
HEPATITIS B SURFACE AB IMMUNITY, QN: 11
HEPATITIS B SURFACE ANTIGEN: (no result)
LYMPHOCYTES: 39.4
MCH: 29.3
MCHC: 34.1
MCV: 85.9
MITOGEN-NIL: 4.62
MONOCYTES: 6.1
MPV: 10.6
NEUTROPHILS: 50
PLATELET COUNT: 339
POTASSIUM: 3.9
PROTEIN, TOTAL: 6.9
QUANTIFERON NIL VALUE: 0.02
QUANTIFERON TB1 AG VALUE: 2.15
QUANTIFERON TB2 AG VALUE: 2.25
QUANTIFERON-TB GOLD PLUS: POSITIVE
RDW: 12.8
RED BLOOD CELL COUNT: 3.82
SODIUM: 137
WHITE BLOOD CELL COUNT: 9.1

## 2023-10-05 ENCOUNTER — ERX REFILL RESPONSE (OUTPATIENT)
Dept: URBAN - METROPOLITAN AREA CLINIC 7 | Facility: CLINIC | Age: 43
End: 2023-10-05

## 2023-10-05 RX ORDER — MESALAMINE 1.2 G/1
4 TABLETS WITH A MEAL TABLET, DELAYED RELEASE ORAL ONCE A DAY
Qty: 120 | Refills: 3 | OUTPATIENT

## 2023-10-05 RX ORDER — MESALAMINE 1.2 G/1
TAKE 4 TABLETS BY MOUTH ONCE DAILY WITH A MEAL TABLET, DELAYED RELEASE ORAL
Qty: 120 TABLET | Refills: 5 | OUTPATIENT

## 2023-10-19 ENCOUNTER — OFFICE VISIT (OUTPATIENT)
Dept: URBAN - METROPOLITAN AREA SURGERY CENTER 5 | Facility: SURGERY CENTER | Age: 43
End: 2023-10-19

## 2023-11-09 ENCOUNTER — TELEPHONE ENCOUNTER (OUTPATIENT)
Dept: URBAN - METROPOLITAN AREA CLINIC 7 | Facility: CLINIC | Age: 43
End: 2023-11-09

## 2024-02-29 ENCOUNTER — OV EP (OUTPATIENT)
Dept: URBAN - METROPOLITAN AREA CLINIC 7 | Facility: CLINIC | Age: 44
End: 2024-02-29

## 2024-02-29 ENCOUNTER — LAB (OUTPATIENT)
Dept: URBAN - METROPOLITAN AREA CLINIC 7 | Facility: CLINIC | Age: 44
End: 2024-02-29

## 2024-02-29 VITALS — HEIGHT: 64 IN | WEIGHT: 225.6 LBS | BODY MASS INDEX: 38.51 KG/M2 | TEMPERATURE: 97.8 F

## 2024-02-29 RX ORDER — MESALAMINE 1.2 G/1
TAKE 4 TABLETS BY MOUTH ONCE DAILY WITH A MEAL TABLET, DELAYED RELEASE ORAL
Qty: 120 TABLET | Refills: 5 | Status: ACTIVE | COMMUNITY

## 2024-02-29 RX ORDER — OMEPRAZOLE 20 MG/1
1 CAPSULE 30 MINUTES BEFORE MORNING MEAL CAPSULE, DELAYED RELEASE ORAL ONCE A DAY
Status: ACTIVE | COMMUNITY

## 2024-02-29 RX ORDER — FERROUS SULFATE 325(65) MG
1 TABLET TABLET ORAL
Status: ACTIVE | COMMUNITY

## 2024-02-29 RX ORDER — MESALAMINE 1.2 G/1
TAKE 4 TABLETS BY MOUTH ONCE DAILY WITH A MEAL TABLET, DELAYED RELEASE ORAL
Qty: 120 TABLET | Refills: 0
End: 2024-05-29

## 2024-02-29 NOTE — HPI-TODAY'S VISIT:
23-year-old female with history of ulcerative colitis had been on mesalamine for 8 years previously on Lialda and occasionally a rectal mesalamine for at least the past 10 years.  In September 2021 she had a colonoscopy that was poorly prepped showing Moseley 2 colitis in descending: Continuous through rectum.  At that time her fecal calprotectin was 1130.  C. difficile and H. pylori stool was negative.  Her initial UC diagnosis was in 2008.  She was previously status post quad therapy for H. pylori treated in 2021 by Dr. Ramesh.  She had had multiple salvage therapies for H. pylori and had been treated at least 5 attempts.  She is currently on mesalamine 2.4 g daily.  No recent fevers chills.  She has about a dozen watery bowel movements per day sometimes mixed with blood.  She had previously tested positive for TB and was initiated in an INH regimen for 12 weeks.  Interval hx 2/29/24 she is doing well still have scant blood per rectum and occasional mucus in stool. overall she is having formed stools but occasional urgency. no night time awakening.  she is much better than she previously was doing. she is compliant with mesalamine 4 tabs daily.  She notes she is having increasing heartburn and epigastric burining pain not controlle diwht omeprazole 40mg daily and otc bismuth.  no melena no dysphagia no n/v

## 2024-03-20 ENCOUNTER — COLON (OUTPATIENT)
Dept: URBAN - METROPOLITAN AREA SURGERY CENTER 5 | Facility: SURGERY CENTER | Age: 44
End: 2024-03-20
Payer: COMMERCIAL

## 2024-03-20 DIAGNOSIS — K63.89 OTHER SPECIFIED DISEASES OF INTESTINE: ICD-10-CM

## 2024-03-20 DIAGNOSIS — Z53.8 PROCEDURE AND TREATMENT NOT CARRIED OUT FOR OTHER REASONS: ICD-10-CM

## 2024-03-20 PROCEDURE — 45378 DIAGNOSTIC COLONOSCOPY: CPT | Performed by: INTERNAL MEDICINE

## 2024-03-20 RX ORDER — FERROUS SULFATE 325(65) MG
1 TABLET TABLET ORAL
Status: ACTIVE | COMMUNITY

## 2024-03-20 RX ORDER — OMEPRAZOLE 20 MG/1
1 CAPSULE 30 MINUTES BEFORE MORNING MEAL CAPSULE, DELAYED RELEASE ORAL ONCE A DAY
Status: ACTIVE | COMMUNITY

## 2024-03-20 RX ORDER — MESALAMINE 1.2 G/1
TAKE 4 TABLETS BY MOUTH ONCE DAILY WITH A MEAL TABLET, DELAYED RELEASE ORAL
Qty: 120 TABLET | Refills: 0 | Status: ACTIVE | COMMUNITY
End: 2024-05-29

## 2024-03-27 ENCOUNTER — LAB (OUTPATIENT)
Dept: URBAN - METROPOLITAN AREA CLINIC 7 | Facility: CLINIC | Age: 44
End: 2024-03-27

## 2024-04-03 ENCOUNTER — EGD (OUTPATIENT)
Dept: URBAN - METROPOLITAN AREA SURGERY CENTER 5 | Facility: SURGERY CENTER | Age: 44
End: 2024-04-03
Payer: COMMERCIAL

## 2024-04-03 DIAGNOSIS — K29.70 GASTRITIS WITHOUT BLEEDING, UNSPECIFIED CHRONICITY, UNSPECIFIED GASTRITIS TYPE: ICD-10-CM

## 2024-04-03 PROCEDURE — 43239 EGD BIOPSY SINGLE/MULTIPLE: CPT | Performed by: INTERNAL MEDICINE

## 2024-04-03 RX ORDER — OMEPRAZOLE 20 MG/1
1 CAPSULE 30 MINUTES BEFORE MORNING MEAL CAPSULE, DELAYED RELEASE ORAL ONCE A DAY
Status: ACTIVE | COMMUNITY

## 2024-04-03 RX ORDER — FERROUS SULFATE 325(65) MG
1 TABLET TABLET ORAL
Status: ACTIVE | COMMUNITY

## 2024-04-03 RX ORDER — MESALAMINE 1.2 G/1
TAKE 4 TABLETS BY MOUTH ONCE DAILY WITH A MEAL TABLET, DELAYED RELEASE ORAL
Qty: 120 TABLET | Refills: 0 | Status: ACTIVE | COMMUNITY
End: 2024-05-29

## 2024-05-07 ENCOUNTER — OFFICE VISIT (OUTPATIENT)
Dept: URBAN - METROPOLITAN AREA SURGERY CENTER 5 | Facility: SURGERY CENTER | Age: 44
End: 2024-05-07
Payer: COMMERCIAL

## 2024-05-07 DIAGNOSIS — K52.89 OTHER AND UNSPECIFIED NONINFECTIOUS GASTROENTERITIS AND COLITIS: ICD-10-CM

## 2024-05-07 DIAGNOSIS — K51.50 LEFT SIDED COLITIS WITHOUT COMPLICATIONS: ICD-10-CM

## 2024-05-07 DIAGNOSIS — K64.8 OTHER HEMORRHOIDS: ICD-10-CM

## 2024-05-07 DIAGNOSIS — K62.89 PROCTITIS: ICD-10-CM

## 2024-05-07 PROCEDURE — 45380 COLONOSCOPY AND BIOPSY: CPT | Performed by: INTERNAL MEDICINE

## 2024-05-07 PROCEDURE — 00811 ANES LWR INTST NDSC NOS: CPT | Performed by: NURSE ANESTHETIST, CERTIFIED REGISTERED

## 2024-05-07 RX ORDER — MESALAMINE 1.2 G/1
TAKE 4 TABLETS BY MOUTH ONCE DAILY WITH A MEAL TABLET, DELAYED RELEASE ORAL
Qty: 120 TABLET | Refills: 0 | Status: ACTIVE | COMMUNITY
End: 2024-05-29

## 2024-05-07 RX ORDER — FERROUS SULFATE 325(65) MG
1 TABLET TABLET ORAL
Status: ACTIVE | COMMUNITY

## 2024-05-07 RX ORDER — OMEPRAZOLE 20 MG/1
1 CAPSULE 30 MINUTES BEFORE MORNING MEAL CAPSULE, DELAYED RELEASE ORAL ONCE A DAY
Status: ACTIVE | COMMUNITY

## 2024-07-26 ENCOUNTER — TELEPHONE ENCOUNTER (OUTPATIENT)
Dept: URBAN - METROPOLITAN AREA CLINIC 7 | Facility: CLINIC | Age: 44
End: 2024-07-26

## 2024-08-01 ENCOUNTER — TELEPHONE ENCOUNTER (OUTPATIENT)
Dept: URBAN - METROPOLITAN AREA CLINIC 8 | Facility: CLINIC | Age: 44
End: 2024-08-01

## 2025-05-08 ENCOUNTER — OFFICE VISIT (OUTPATIENT)
Dept: URBAN - METROPOLITAN AREA CLINIC 9 | Facility: CLINIC | Age: 45
End: 2025-05-08
Payer: COMMERCIAL

## 2025-05-08 ENCOUNTER — DASHBOARD ENCOUNTERS (OUTPATIENT)
Age: 45
End: 2025-05-08

## 2025-05-08 DIAGNOSIS — R76.12 POSITIVE QUANTIFERON-TB GOLD TEST: ICD-10-CM

## 2025-05-08 DIAGNOSIS — K51.90 ULCERATIVE COLITIS: ICD-10-CM

## 2025-05-08 DIAGNOSIS — K62.5 RECTAL BLEEDING: ICD-10-CM

## 2025-05-08 PROCEDURE — 99214 OFFICE O/P EST MOD 30 MIN: CPT | Performed by: INTERNAL MEDICINE

## 2025-05-08 RX ORDER — MESALAMINE 4 G/60ML
USE ONE ENEMA RECTALLY EVERY NIGHT AT BEDTIME AS NEEDED FOR 28 DAYS SUSPENSION RECTAL
Qty: 1680 MILLILITER | Refills: 2 | Status: ACTIVE | COMMUNITY

## 2025-05-08 RX ORDER — FERROUS SULFATE 325(65) MG
1 TABLET TABLET ORAL
Status: ACTIVE | COMMUNITY

## 2025-05-08 RX ORDER — MESALAMINE 1.2 G/1
TAKE 4 TABLETS BY MOUTH ONE TIME A DAY WITH A MEAL TABLET, DELAYED RELEASE ORAL
OUTPATIENT

## 2025-05-08 RX ORDER — MESALAMINE 1.2 G/1
TAKE 4 TABLETS BY MOUTH ONE TIME A DAY WITH A MEAL TABLET, DELAYED RELEASE ORAL
Qty: 360 EACH | Refills: 0 | Status: ACTIVE | COMMUNITY

## 2025-05-08 RX ORDER — OMEPRAZOLE 20 MG/1
1 CAPSULE 30 MINUTES BEFORE MORNING MEAL CAPSULE, DELAYED RELEASE ORAL ONCE A DAY
Status: ACTIVE | COMMUNITY

## 2025-05-08 RX ORDER — MESALAMINE 4 G/60ML
USE ONE ENEMA RECTALLY EVERY NIGHT AT BEDTIME AS NEEDED FOR 28 DAYS SUSPENSION RECTAL
OUTPATIENT

## 2025-05-08 NOTE — HPI-TODAY'S VISIT:
23-year-old female with history of ulcerative colitis had been on mesalamine for 8 years previously on Lialda and occasionally a rectal mesalamine for at least the past 10 years.  In September 2021 she had a colonoscopy that was poorly prepped showing Moseley 2 colitis in descending: Continuous through rectum.  At that time her fecal calprotectin was 1130.  C. difficile and H. pylori stool was negative.  Her initial UC diagnosis was in 2008.  She was previously status post quad therapy for H. pylori treated in 2021 by Dr. Ramesh.  She had had multiple salvage therapies for H. pylori and had been treated at least 5 attempts.  She is currently on mesalamine 2.4 g daily.  No recent fevers chills.  She has about a dozen watery bowel movements per day sometimes mixed with blood.  She had previously tested positive for TB and was initiated in an INH regimen for 12 weeks.  Interval hx 2/29/24 she is doing well still have scant blood per rectum and occasional mucus in stool. overall she is having formed stools but occasional urgency. no night time awakening.  she is much better than she previously was doing. she is compliant with mesalamine 4 tabs daily.  She notes she is having increasing heartburn and epigastric burining pain not controlle diwht omeprazole 40mg daily and otc bismuth.  no melena no dysphagia no n/v  IH 5/8/2025 44 year old female presents today for ulcerative colitis  prior colon 5/7/2024-active left sided UC (moseley score 2), poor prep, stool in the entire colon-repeat in 1 year  she says her colitis has been stable up until last week she got some abdominal pain. no diarrhea, no constipation. she did have a little rectal bleeding last week but has resolved.  she is taking mesalamine capsules and enemas.  she is positive for TB. she went to see ID in [place] and said they didn't have much to say so she is going to call her insurance srini and find out what other local ID doctors take her insurance so we will send a new referral

## 2025-05-15 ENCOUNTER — TELEPHONE ENCOUNTER (OUTPATIENT)
Dept: URBAN - METROPOLITAN AREA CLINIC 7 | Facility: CLINIC | Age: 45
End: 2025-05-15

## 2025-05-27 ENCOUNTER — TELEPHONE ENCOUNTER (OUTPATIENT)
Dept: URBAN - METROPOLITAN AREA CLINIC 7 | Facility: CLINIC | Age: 45
End: 2025-05-27

## 2025-06-03 ENCOUNTER — TELEPHONE ENCOUNTER (OUTPATIENT)
Dept: URBAN - METROPOLITAN AREA CLINIC 9 | Facility: CLINIC | Age: 45
End: 2025-06-03

## 2025-06-03 RX ORDER — MESALAMINE 1000 MG/1
1 SUPPOSITORY AT BEDTIME SUPPOSITORY RECTAL ONCE A DAY
Qty: 30 | Refills: 2 | OUTPATIENT
Start: 2025-06-04

## 2025-06-03 RX ORDER — MESALAMINE 1.2 G/1
TAKE 4 TABLETS BY MOUTH ONE TIME A DAY WITH A MEAL TABLET, DELAYED RELEASE ORAL ONCE A DAY
Qty: 120 | Refills: 2

## 2025-08-20 ENCOUNTER — TELEPHONE ENCOUNTER (OUTPATIENT)
Dept: URBAN - METROPOLITAN AREA CLINIC 7 | Facility: CLINIC | Age: 45
End: 2025-08-20

## 2025-08-21 ENCOUNTER — LAB OUTSIDE AN ENCOUNTER (OUTPATIENT)
Dept: URBAN - METROPOLITAN AREA SURGERY CENTER 9 | Facility: SURGERY CENTER | Age: 45
End: 2025-08-21